# Patient Record
Sex: FEMALE | Race: OTHER | HISPANIC OR LATINO | URBAN - METROPOLITAN AREA
[De-identification: names, ages, dates, MRNs, and addresses within clinical notes are randomized per-mention and may not be internally consistent; named-entity substitution may affect disease eponyms.]

---
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
## 2021-01-13 ENCOUNTER — TRANSCRIPTION ENCOUNTER (OUTPATIENT)
Age: 21
End: 2021-01-13

## 2021-02-20 ENCOUNTER — TRANSCRIPTION ENCOUNTER (OUTPATIENT)
Age: 21
End: 2021-02-20

## 2021-03-07 ENCOUNTER — TRANSCRIPTION ENCOUNTER (OUTPATIENT)
Age: 21
End: 2021-03-07

## 2021-03-21 ENCOUNTER — TRANSCRIPTION ENCOUNTER (OUTPATIENT)
Age: 21
End: 2021-03-21

## 2021-04-19 ENCOUNTER — APPOINTMENT (OUTPATIENT)
Dept: SURGERY | Facility: CLINIC | Age: 21
End: 2021-04-19
Payer: MEDICAID

## 2021-04-19 VITALS
TEMPERATURE: 97.8 F | HEART RATE: 90 BPM | WEIGHT: 125 LBS | HEIGHT: 63 IN | OXYGEN SATURATION: 98 % | SYSTOLIC BLOOD PRESSURE: 119 MMHG | DIASTOLIC BLOOD PRESSURE: 72 MMHG | BODY MASS INDEX: 22.15 KG/M2

## 2021-04-19 DIAGNOSIS — K80.20 CALCULUS OF GALLBLADDER W/OUT CHOLECYSTITIS W/OUT OBSTRUCTION: ICD-10-CM

## 2021-04-19 DIAGNOSIS — Z83.79 FAMILY HISTORY OF OTHER DISEASES OF THE DIGESTIVE SYSTEM: ICD-10-CM

## 2021-04-19 DIAGNOSIS — Z01.818 ENCOUNTER FOR OTHER PREPROCEDURAL EXAMINATION: ICD-10-CM

## 2021-04-19 DIAGNOSIS — Z78.9 OTHER SPECIFIED HEALTH STATUS: ICD-10-CM

## 2021-04-19 PROCEDURE — 99204 OFFICE O/P NEW MOD 45 MIN: CPT

## 2021-04-19 PROCEDURE — 99072 ADDL SUPL MATRL&STAF TM PHE: CPT

## 2021-04-19 NOTE — DATA REVIEWED
[FreeTextEntry1] : \par PROCEDURE DATE: Feb 26 2020 \par INTERPRETATION: CLINICAL INFORMATION: Elevated LFTs and bilirubin. Assess \par for choledocholithiasis. \par \par COMPARISON: Abdominal ultrasound 02/25/2020. \par \par PROCEDURE: \par MRI of the abdomen was performed with and without intravenous contrast. \par IV Contrast: Gadavist. 5.5 cc administered, 2 cc discarded. \par MRCP was performed. \par \par FINDINGS: \par \par LOWER CHEST: Within normal limits. \par \par LIVER: Normal morphology. No significant steatosis. No suspicious enhancing \par lesions. \par BILE DUCTS: Normal caliber. \par GALLBLADDER: Gallbladder is nondilated, but is filled with numerous stones. \par No wall edema or pericholecystic fluid. \par SPLEEN: Within normal limits. \par PANCREAS: Within normal limits. \par ADRENALS: Within normal limits. \par KIDNEYS/URETERS: Within normal limits. \par \par VISUALIZED PORTIONS: \par \par BOWEL: Within normal limits. \par PERITONEUM: Trace to small pelvic ascites. \par VESSELS: Within normal limits. Patent portal and hepatic veins. \par RETROPERITONEUM/LYMPH NODES: No lymphadenopathy. \par ABDOMINAL WALL: Within normal limits. \par BONES: Within normal limits. \par \par IMPRESSION: \par \par Cholelithiasis. No biliary distention or choledocholithiasis. \par \par \par AMY JUAN M.D., ATTENDING RADIOLOGIST \par This document has been electronically signed. Feb 26

## 2021-04-19 NOTE — HISTORY OF PRESENT ILLNESS
[de-identified] : Ms. TADEO LUNDBERG is a 20 year  old patient who was referred by Dr. Newton Li with the chief complaint of having right upper quadrant and epigastric pain for more than a year. Pain is  radiating to the back. She reports no nausea or vomiting and no history of jaundice, acholia or choluria.   Appetite is good and weight is stable.   She   has  family history of biliary tract disease in her father .  She had an abdominal  MRI  on  02/26/2020 which revealed  numerous GB stones CBD is normal \par \par

## 2021-04-19 NOTE — CONSULT LETTER
[Dear  ___] : Dear  [unfilled], [Consult Letter:] : I had the pleasure of evaluating your patient, [unfilled]. [Consult Closing:] : Thank you very much for allowing me to participate in the care of this patient.  If you have any questions, please do not hesitate to contact me. [Please see my note below.] : Please see my note below. [Sincerely,] : Sincerely, [FreeTextEntry3] : Kevin Keen MD, FACS

## 2021-04-19 NOTE — PHYSICAL EXAM
[Abdominal Masses] : No abdominal masses [Abdomen Tenderness] : ~T ~M No abdominal tenderness [Alert] : alert [Oriented to Person] : oriented to person [Oriented to Place] : oriented to place [Oriented to Time] : oriented to time [Calm] : calm [de-identified] : anicteric.  Nasal mucosa pink, septum midline. Oral mucosa pink.  Tongue midline, Pharynx without exudates.\par   [de-identified] : She  is alert, well-groomed, and in NAD\par   [de-identified] : Neck supple. Trachea midline. Thyroid isthmus barely palpable, lobes not felt.\par   [de-identified] : no hernia

## 2021-04-19 NOTE — PLAN
[FreeTextEntry1] : Ms. LUNDBERG  was told significance of findings, options, risks and benefits were explained.  Informed consent for laparoscopic/possible open  cholecystectomy  and potential risks, benefits and alternatives (surgical options were discussed including non-surgical options or the option of no surgery) to the planned surgery were discussed in depth.  All surgical options were discussed including non-surgical treatments.  She wishes to proceed with surgery.  We will plan for surgery on at the next available date, pending any required insurance pre-certification or pre-approval. She agrees to obtain any necessary pre-operative evaluations and testing prior to surgery. Patient instructed to maintain a fat-free diet, and to seek immediate medical attention with any acute change or worsening of symptoms, including but not limited to abdominal pain, fever, chills, nausea, vomiting, or yellowing of the skin. \par Patient advised to seek immediate medical attention with any acute change in symptoms or with the development of any new or worsening symptoms.  Patient's questions and concerns addressed to patient's satisfaction, and patient verbalized an understanding of the information discussed.

## 2021-04-20 ENCOUNTER — APPOINTMENT (OUTPATIENT)
Dept: DISASTER EMERGENCY | Facility: CLINIC | Age: 21
End: 2021-04-20

## 2021-04-21 LAB — SARS-COV-2 N GENE NPH QL NAA+PROBE: NOT DETECTED

## 2021-04-22 ENCOUNTER — OUTPATIENT (OUTPATIENT)
Dept: OUTPATIENT SERVICES | Facility: HOSPITAL | Age: 21
LOS: 1 days | End: 2021-04-22
Payer: MEDICAID

## 2021-04-22 ENCOUNTER — TRANSCRIPTION ENCOUNTER (OUTPATIENT)
Age: 21
End: 2021-04-22

## 2021-04-22 VITALS
RESPIRATION RATE: 16 BRPM | SYSTOLIC BLOOD PRESSURE: 108 MMHG | DIASTOLIC BLOOD PRESSURE: 70 MMHG | HEART RATE: 68 BPM | OXYGEN SATURATION: 99 % | WEIGHT: 125 LBS | HEIGHT: 63 IN | TEMPERATURE: 97 F

## 2021-04-22 DIAGNOSIS — Z01.818 ENCOUNTER FOR OTHER PREPROCEDURAL EXAMINATION: ICD-10-CM

## 2021-04-22 DIAGNOSIS — K81.9 CHOLECYSTITIS, UNSPECIFIED: ICD-10-CM

## 2021-04-22 DIAGNOSIS — Z29.9 ENCOUNTER FOR PROPHYLACTIC MEASURES, UNSPECIFIED: ICD-10-CM

## 2021-04-22 LAB
ALBUMIN SERPL ELPH-MCNC: 4.1 G/DL — SIGNIFICANT CHANGE UP (ref 3.5–5)
ALP SERPL-CCNC: 73 U/L — SIGNIFICANT CHANGE UP (ref 40–120)
ALT FLD-CCNC: 31 U/L DA — SIGNIFICANT CHANGE UP (ref 10–60)
ANION GAP SERPL CALC-SCNC: 8 MMOL/L — SIGNIFICANT CHANGE UP (ref 5–17)
APTT BLD: 34.7 SEC — SIGNIFICANT CHANGE UP (ref 27.5–35.5)
AST SERPL-CCNC: 17 U/L — SIGNIFICANT CHANGE UP (ref 10–40)
BILIRUB SERPL-MCNC: 1.6 MG/DL — HIGH (ref 0.2–1.2)
BLD GP AB SCN SERPL QL: SIGNIFICANT CHANGE UP
BUN SERPL-MCNC: 10 MG/DL — SIGNIFICANT CHANGE UP (ref 7–18)
CALCIUM SERPL-MCNC: 9.2 MG/DL — SIGNIFICANT CHANGE UP (ref 8.4–10.5)
CHLORIDE SERPL-SCNC: 105 MMOL/L — SIGNIFICANT CHANGE UP (ref 96–108)
CO2 SERPL-SCNC: 26 MMOL/L — SIGNIFICANT CHANGE UP (ref 22–31)
CREAT SERPL-MCNC: 0.55 MG/DL — SIGNIFICANT CHANGE UP (ref 0.5–1.3)
GLUCOSE SERPL-MCNC: 86 MG/DL — SIGNIFICANT CHANGE UP (ref 70–99)
HCG SERPL-ACNC: <1 MIU/ML — SIGNIFICANT CHANGE UP
HCT VFR BLD CALC: 39.7 % — SIGNIFICANT CHANGE UP (ref 34.5–45)
HGB BLD-MCNC: 13 G/DL — SIGNIFICANT CHANGE UP (ref 11.5–15.5)
INR BLD: 1.06 RATIO — SIGNIFICANT CHANGE UP (ref 0.88–1.16)
MCHC RBC-ENTMCNC: 29.5 PG — SIGNIFICANT CHANGE UP (ref 27–34)
MCHC RBC-ENTMCNC: 32.7 GM/DL — SIGNIFICANT CHANGE UP (ref 32–36)
MCV RBC AUTO: 90.2 FL — SIGNIFICANT CHANGE UP (ref 80–100)
NRBC # BLD: 0 /100 WBCS — SIGNIFICANT CHANGE UP (ref 0–0)
PLATELET # BLD AUTO: 187 K/UL — SIGNIFICANT CHANGE UP (ref 150–400)
POTASSIUM SERPL-MCNC: 3.8 MMOL/L — SIGNIFICANT CHANGE UP (ref 3.5–5.3)
POTASSIUM SERPL-SCNC: 3.8 MMOL/L — SIGNIFICANT CHANGE UP (ref 3.5–5.3)
PROT SERPL-MCNC: 7.7 G/DL — SIGNIFICANT CHANGE UP (ref 6–8.3)
PROTHROM AB SERPL-ACNC: 12.6 SEC — SIGNIFICANT CHANGE UP (ref 10.6–13.6)
RBC # BLD: 4.4 M/UL — SIGNIFICANT CHANGE UP (ref 3.8–5.2)
RBC # FLD: 12.5 % — SIGNIFICANT CHANGE UP (ref 10.3–14.5)
SODIUM SERPL-SCNC: 139 MMOL/L — SIGNIFICANT CHANGE UP (ref 135–145)
WBC # BLD: 5.79 K/UL — SIGNIFICANT CHANGE UP (ref 3.8–10.5)
WBC # FLD AUTO: 5.79 K/UL — SIGNIFICANT CHANGE UP (ref 3.8–10.5)

## 2021-04-22 PROCEDURE — G0463: CPT

## 2021-04-22 NOTE — H&P PST ADULT - HISTORY OF PRESENT ILLNESS
20 yr old female in generally good heathy presented with gallstones in March 2020 and had sonogram that revealed and confirmed the diagnoses. Pt experienced pain all over stomach with nausea and vomiting on and off. Pt followed recommendations with low fat diet. In Jan pt had nausea and some vomiting and sought surgical consult. Pt schedule for Laparoscopic cholecystectomy with intra-op cholangiogram possible open on 4/23/2021. Pt instructed on the use of chlorhexidine wash and verbalized understanding.

## 2021-04-22 NOTE — H&P PST ADULT - NSICDXPROBLEM_GEN_ALL_CORE_FT
PROBLEM DIAGNOSES  Problem: Need for prophylactic measure  Assessment and Plan:     Problem: Cholecystitis, unspecified  Assessment and Plan:

## 2021-04-22 NOTE — H&P PST ADULT - ASSESSMENT
20 yr old healthy female schedule for Laparoscopic cholecystetomy with intra operative cholangiogram possible open on 4/23/2021.

## 2021-04-23 ENCOUNTER — OUTPATIENT (OUTPATIENT)
Dept: OUTPATIENT SERVICES | Facility: HOSPITAL | Age: 21
LOS: 1 days | End: 2021-04-23
Payer: MEDICAID

## 2021-04-23 ENCOUNTER — APPOINTMENT (OUTPATIENT)
Dept: SURGERY | Facility: HOSPITAL | Age: 21
End: 2021-04-23
Payer: MEDICAID

## 2021-04-23 ENCOUNTER — RESULT REVIEW (OUTPATIENT)
Age: 21
End: 2021-04-23

## 2021-04-23 VITALS
DIASTOLIC BLOOD PRESSURE: 71 MMHG | WEIGHT: 125 LBS | RESPIRATION RATE: 16 BRPM | SYSTOLIC BLOOD PRESSURE: 103 MMHG | HEIGHT: 63 IN | HEART RATE: 80 BPM | OXYGEN SATURATION: 100 % | TEMPERATURE: 98 F

## 2021-04-23 VITALS
OXYGEN SATURATION: 100 % | DIASTOLIC BLOOD PRESSURE: 57 MMHG | RESPIRATION RATE: 17 BRPM | SYSTOLIC BLOOD PRESSURE: 104 MMHG | HEART RATE: 71 BPM | TEMPERATURE: 98 F

## 2021-04-23 DIAGNOSIS — K81.9 CHOLECYSTITIS, UNSPECIFIED: ICD-10-CM

## 2021-04-23 LAB — BLD GP AB SCN SERPL QL: SIGNIFICANT CHANGE UP

## 2021-04-23 PROCEDURE — 47563 LAPARO CHOLECYSTECTOMY/GRAPH: CPT

## 2021-04-23 PROCEDURE — 86850 RBC ANTIBODY SCREEN: CPT

## 2021-04-23 PROCEDURE — 36415 COLL VENOUS BLD VENIPUNCTURE: CPT

## 2021-04-23 PROCEDURE — 47563 LAPARO CHOLECYSTECTOMY/GRAPH: CPT | Mod: AS

## 2021-04-23 PROCEDURE — 86900 BLOOD TYPING SEROLOGIC ABO: CPT

## 2021-04-23 PROCEDURE — 88304 TISSUE EXAM BY PATHOLOGIST: CPT | Mod: 26

## 2021-04-23 PROCEDURE — 88304 TISSUE EXAM BY PATHOLOGIST: CPT

## 2021-04-23 PROCEDURE — 86901 BLOOD TYPING SEROLOGIC RH(D): CPT

## 2021-04-23 PROCEDURE — 76000 FLUOROSCOPY <1 HR PHYS/QHP: CPT

## 2021-04-23 RX ORDER — SODIUM CHLORIDE 9 MG/ML
3 INJECTION INTRAMUSCULAR; INTRAVENOUS; SUBCUTANEOUS EVERY 8 HOURS
Refills: 0 | Status: DISCONTINUED | OUTPATIENT
Start: 2021-04-23 | End: 2021-04-23

## 2021-04-23 RX ORDER — ONDANSETRON 8 MG/1
4 TABLET, FILM COATED ORAL ONCE
Refills: 0 | Status: DISCONTINUED | OUTPATIENT
Start: 2021-04-23 | End: 2021-04-23

## 2021-04-23 RX ORDER — HYDROMORPHONE HYDROCHLORIDE 2 MG/ML
0.5 INJECTION INTRAMUSCULAR; INTRAVENOUS; SUBCUTANEOUS
Refills: 0 | Status: DISCONTINUED | OUTPATIENT
Start: 2021-04-23 | End: 2021-04-23

## 2021-04-23 RX ORDER — GABAPENTIN 400 MG/1
1 CAPSULE ORAL
Qty: 8 | Refills: 0
Start: 2021-04-23 | End: 2021-04-26

## 2021-04-23 RX ORDER — FENTANYL CITRATE 50 UG/ML
25 INJECTION INTRAVENOUS
Refills: 0 | Status: DISCONTINUED | OUTPATIENT
Start: 2021-04-23 | End: 2021-04-23

## 2021-04-23 NOTE — ASU DISCHARGE PLAN (ADULT/PEDIATRIC) - ASU DC SPECIAL INSTRUCTIONSFT
Please follow-up with your surgeon in 1 week. Drink plenty of fluids and rest as needed. Call for any fever over 101, nausea, vomiting, severe pain, no passing of gas or bowel movement.     DIET   You may resume your regular diet as normal.     SURGICAL SITES   Remove outer dressing and keep white steri-strips in place allowing them to fall off on their own. You may shower 48 hours post-operatively but do not bathe or soak in the water for 1-2 weeks; pat dry. If you notice any signs of surgical site infection (ie. redness, swelling, pain, pus drainage), please seek medical care immediately.   ACTIVITY Do not lift anything heavier than 10 pounds for 2 weeks and avoid strenuous activity for 4-6 weeks.     PAIN CONTROL   You may take Motrin 600mg (with food) or Tylenol 650mg as needed for mild pain. Stagger one medication 3 hours after the other for maximum pain control. Maximum daily dose of Tylenol should not exceed 4grams/day.  You may take your prescribed gabapentin for severe breakthrough pain not that is not relieved by Tylenol/Motrin. Do not drive or make important decisions while taking this medication and do not take more than 4 pills in 24 hours.

## 2021-04-23 NOTE — ASU PREOP CHECKLIST - SELECT TESTS ORDERED
Detail Level: Detailed
Detail Level: Generalized
BMP/CBC/PT/PTT/INR/Type and Cross/Type and Screen/HCG/COVID-19

## 2021-04-23 NOTE — ASU DISCHARGE PLAN (ADULT/PEDIATRIC) - CARE PROVIDER_API CALL
Kevin Keen)  Surgery  [address] [facility], [place] [address]  Phone: [phone]  Fax: [phone]  Established Patient  Follow Up Time: 2 weeks

## 2021-04-26 PROBLEM — K81.9 CHOLECYSTITIS, UNSPECIFIED: Chronic | Status: ACTIVE | Noted: 2021-04-22

## 2021-04-27 ENCOUNTER — TRANSCRIPTION ENCOUNTER (OUTPATIENT)
Age: 21
End: 2021-04-27

## 2021-04-28 LAB — SURGICAL PATHOLOGY STUDY: SIGNIFICANT CHANGE UP

## 2021-05-04 PROBLEM — K81.9 CHOLECYSTITIS: Status: ACTIVE | Noted: 2021-04-19

## 2021-05-10 ENCOUNTER — APPOINTMENT (OUTPATIENT)
Dept: SURGERY | Facility: CLINIC | Age: 21
End: 2021-05-10
Payer: MEDICAID

## 2021-05-10 VITALS — TEMPERATURE: 96.4 F

## 2021-05-10 DIAGNOSIS — K81.9 CHOLECYSTITIS, UNSPECIFIED: ICD-10-CM

## 2021-05-10 PROCEDURE — 99024 POSTOP FOLLOW-UP VISIT: CPT

## 2021-05-10 NOTE — PHYSICAL EXAM
[Calm] : calm [de-identified] : She  is alert, well-groomed, and in NAD\par   [de-identified] : Surgical wounds are  healing well.   no signs of  inflammation or infection.

## 2021-05-10 NOTE — HISTORY OF PRESENT ILLNESS
[de-identified] : Ms. LUNDBERG  is s/p laparoscopic  cholecystectomy on 04/23/2021. Today Ms. LUNDBERG offers no complaints. patient reports no fever, chills,  or  pain. Her  surgical wounds are  healing well. No signs of inflammation, infection or exudate. Patient reports good bowel movements and appetite.

## 2021-05-10 NOTE — DATA REVIEWED
[FreeTextEntry1] : [facility] Accession Number : [id number]\par \par LUNDBERG, TADEOSHANNON SOLOMON               2\par \par \par \par Surgical Final Report\par \par \par \par \par Final Diagnosis\par Gallbladder, cholecystectomy: Chronic calculous cholecystitis\par with mild\par mucosal attenuation\par \par Verified by: Esperanza Perea M.D.\par (Electronic Signature)\par Reported on: 04/28/21 13:55 EDT, [facility],\par [address] [place] [address]\par Phone: [phone]   Fax: [phone]\par _________________________________________________________________\par \par Clinical History\par Cholecystitis\par \par Specimen(s) Submitted\par Gallbladder

## 2021-05-10 NOTE — PLAN
[FreeTextEntry1] : Ms. LUNDBERG will follow up  if needed. Warning signs, follow up, and restrictions were discussed with the patient.

## 2021-05-10 NOTE — ASSESSMENT
[FreeTextEntry1] : Ms. LUNDBERG is doing well, with excellent post-operative recovery. All surgical incisions are healing well and as expected. There is no evidence of infection or complication, and she is progressing as expected. Post-operative wound care, activity, restrictions and precautions reinforced. Patient instructed to refrain from any heavy lifting greater than 10-15 pounds for at least 4 weeks post-operatively. pathology results were discussed in details.  Patient's questions and concerns addressed to patient's satisfaction.

## 2021-05-30 ENCOUNTER — TRANSCRIPTION ENCOUNTER (OUTPATIENT)
Age: 21
End: 2021-05-30

## 2021-08-20 ENCOUNTER — TRANSCRIPTION ENCOUNTER (OUTPATIENT)
Age: 21
End: 2021-08-20

## 2021-08-24 ENCOUNTER — TRANSCRIPTION ENCOUNTER (OUTPATIENT)
Age: 21
End: 2021-08-24

## 2021-09-28 ENCOUNTER — TRANSCRIPTION ENCOUNTER (OUTPATIENT)
Age: 21
End: 2021-09-28

## 2021-10-12 ENCOUNTER — TRANSCRIPTION ENCOUNTER (OUTPATIENT)
Age: 21
End: 2021-10-12

## 2021-11-18 ENCOUNTER — TRANSCRIPTION ENCOUNTER (OUTPATIENT)
Age: 21
End: 2021-11-18

## 2021-11-19 ENCOUNTER — TRANSCRIPTION ENCOUNTER (OUTPATIENT)
Age: 21
End: 2021-11-19

## 2022-03-21 ENCOUNTER — TRANSCRIPTION ENCOUNTER (OUTPATIENT)
Age: 22
End: 2022-03-21

## 2022-05-14 ENCOUNTER — NON-APPOINTMENT (OUTPATIENT)
Age: 22
End: 2022-05-14

## 2022-06-04 ENCOUNTER — NON-APPOINTMENT (OUTPATIENT)
Age: 22
End: 2022-06-04

## 2022-08-29 ENCOUNTER — NON-APPOINTMENT (OUTPATIENT)
Age: 22
End: 2022-08-29

## 2023-02-23 ENCOUNTER — NON-APPOINTMENT (OUTPATIENT)
Age: 23
End: 2023-02-23

## 2024-01-25 ENCOUNTER — NON-APPOINTMENT (OUTPATIENT)
Age: 24
End: 2024-01-25

## 2024-05-01 ENCOUNTER — NON-APPOINTMENT (OUTPATIENT)
Age: 24
End: 2024-05-01

## 2024-05-10 ENCOUNTER — INPATIENT (INPATIENT)
Facility: HOSPITAL | Age: 24
LOS: 1 days | Discharge: HOME | DRG: 948 | End: 2024-05-12
Attending: STUDENT IN AN ORGANIZED HEALTH CARE EDUCATION/TRAINING PROGRAM | Admitting: STUDENT IN AN ORGANIZED HEALTH CARE EDUCATION/TRAINING PROGRAM
Payer: COMMERCIAL

## 2024-05-10 VITALS
RESPIRATION RATE: 18 BRPM | WEIGHT: 115.96 LBS | OXYGEN SATURATION: 100 % | HEART RATE: 77 BPM | DIASTOLIC BLOOD PRESSURE: 72 MMHG | TEMPERATURE: 98 F | SYSTOLIC BLOOD PRESSURE: 112 MMHG

## 2024-05-10 DIAGNOSIS — R74.01 ELEVATION OF LEVELS OF LIVER TRANSAMINASE LEVELS: ICD-10-CM

## 2024-05-10 DIAGNOSIS — Z90.49 ACQUIRED ABSENCE OF OTHER SPECIFIED PARTS OF DIGESTIVE TRACT: Chronic | ICD-10-CM

## 2024-05-10 DIAGNOSIS — Z29.9 ENCOUNTER FOR PROPHYLACTIC MEASURES, UNSPECIFIED: ICD-10-CM

## 2024-05-10 DIAGNOSIS — R10.9 UNSPECIFIED ABDOMINAL PAIN: ICD-10-CM

## 2024-05-10 LAB
ALBUMIN SERPL ELPH-MCNC: 4.2 G/DL — SIGNIFICANT CHANGE UP (ref 3.5–5)
ALP SERPL-CCNC: 237 U/L — HIGH (ref 40–120)
ALT FLD-CCNC: 489 U/L DA — HIGH (ref 10–60)
ANION GAP SERPL CALC-SCNC: 5 MMOL/L — SIGNIFICANT CHANGE UP (ref 5–17)
APAP SERPL-MCNC: <2 UG/ML — LOW (ref 10–30)
AST SERPL-CCNC: 771 U/L — HIGH (ref 10–40)
BASOPHILS # BLD AUTO: 0.04 K/UL — SIGNIFICANT CHANGE UP (ref 0–0.2)
BASOPHILS NFR BLD AUTO: 0.2 % — SIGNIFICANT CHANGE UP (ref 0–2)
BILIRUB SERPL-MCNC: 1.8 MG/DL — HIGH (ref 0.2–1.2)
BUN SERPL-MCNC: 10 MG/DL — SIGNIFICANT CHANGE UP (ref 7–18)
CALCIUM SERPL-MCNC: 9.5 MG/DL — SIGNIFICANT CHANGE UP (ref 8.4–10.5)
CHLORIDE SERPL-SCNC: 103 MMOL/L — SIGNIFICANT CHANGE UP (ref 96–108)
CK SERPL-CCNC: 111 U/L — SIGNIFICANT CHANGE UP (ref 21–215)
CO2 SERPL-SCNC: 28 MMOL/L — SIGNIFICANT CHANGE UP (ref 22–31)
CREAT SERPL-MCNC: 0.6 MG/DL — SIGNIFICANT CHANGE UP (ref 0.5–1.3)
EGFR: 130 ML/MIN/1.73M2 — SIGNIFICANT CHANGE UP
EOSINOPHIL # BLD AUTO: 0.01 K/UL — SIGNIFICANT CHANGE UP (ref 0–0.5)
EOSINOPHIL NFR BLD AUTO: 0.1 % — SIGNIFICANT CHANGE UP (ref 0–6)
GLUCOSE SERPL-MCNC: 113 MG/DL — HIGH (ref 70–99)
HCG SERPL-ACNC: <1 MIU/ML — SIGNIFICANT CHANGE UP
HCT VFR BLD CALC: 41.9 % — SIGNIFICANT CHANGE UP (ref 34.5–45)
HGB BLD-MCNC: 13.6 G/DL — SIGNIFICANT CHANGE UP (ref 11.5–15.5)
IMM GRANULOCYTES NFR BLD AUTO: 0.3 % — SIGNIFICANT CHANGE UP (ref 0–0.9)
LIDOCAIN IGE QN: 22 U/L — SIGNIFICANT CHANGE UP (ref 13–75)
LYMPHOCYTES # BLD AUTO: 1.01 K/UL — SIGNIFICANT CHANGE UP (ref 1–3.3)
LYMPHOCYTES # BLD AUTO: 5.7 % — LOW (ref 13–44)
MCHC RBC-ENTMCNC: 30.1 PG — SIGNIFICANT CHANGE UP (ref 27–34)
MCHC RBC-ENTMCNC: 32.5 GM/DL — SIGNIFICANT CHANGE UP (ref 32–36)
MCV RBC AUTO: 92.7 FL — SIGNIFICANT CHANGE UP (ref 80–100)
MONOCYTES # BLD AUTO: 0.94 K/UL — HIGH (ref 0–0.9)
MONOCYTES NFR BLD AUTO: 5.3 % — SIGNIFICANT CHANGE UP (ref 2–14)
NEUTROPHILS # BLD AUTO: 15.69 K/UL — HIGH (ref 1.8–7.4)
NEUTROPHILS NFR BLD AUTO: 88.4 % — HIGH (ref 43–77)
NRBC # BLD: 0 /100 WBCS — SIGNIFICANT CHANGE UP (ref 0–0)
PLATELET # BLD AUTO: 216 K/UL — SIGNIFICANT CHANGE UP (ref 150–400)
POTASSIUM SERPL-MCNC: 3.9 MMOL/L — SIGNIFICANT CHANGE UP (ref 3.5–5.3)
POTASSIUM SERPL-SCNC: 3.9 MMOL/L — SIGNIFICANT CHANGE UP (ref 3.5–5.3)
PROT SERPL-MCNC: 7.3 G/DL — SIGNIFICANT CHANGE UP (ref 6–8.3)
RBC # BLD: 4.52 M/UL — SIGNIFICANT CHANGE UP (ref 3.8–5.2)
RBC # FLD: 12.9 % — SIGNIFICANT CHANGE UP (ref 10.3–14.5)
SODIUM SERPL-SCNC: 136 MMOL/L — SIGNIFICANT CHANGE UP (ref 135–145)
TROPONIN I, HIGH SENSITIVITY RESULT: <3 NG/L — SIGNIFICANT CHANGE UP
WBC # BLD: 17.75 K/UL — HIGH (ref 3.8–10.5)
WBC # FLD AUTO: 17.75 K/UL — HIGH (ref 3.8–10.5)

## 2024-05-10 PROCEDURE — 99285 EMERGENCY DEPT VISIT HI MDM: CPT

## 2024-05-10 PROCEDURE — 99222 1ST HOSP IP/OBS MODERATE 55: CPT

## 2024-05-10 PROCEDURE — 99223 1ST HOSP IP/OBS HIGH 75: CPT | Mod: GC

## 2024-05-10 PROCEDURE — 71046 X-RAY EXAM CHEST 2 VIEWS: CPT | Mod: 26

## 2024-05-10 PROCEDURE — 76705 ECHO EXAM OF ABDOMEN: CPT | Mod: 26

## 2024-05-10 PROCEDURE — 74177 CT ABD & PELVIS W/CONTRAST: CPT | Mod: 26,MC

## 2024-05-10 RX ORDER — FAMOTIDINE 10 MG/ML
20 INJECTION INTRAVENOUS ONCE
Refills: 0 | Status: COMPLETED | OUTPATIENT
Start: 2024-05-10 | End: 2024-05-10

## 2024-05-10 RX ORDER — ONDANSETRON 8 MG/1
4 TABLET, FILM COATED ORAL ONCE
Refills: 0 | Status: COMPLETED | OUTPATIENT
Start: 2024-05-10 | End: 2024-05-10

## 2024-05-10 RX ORDER — ONDANSETRON 8 MG/1
4 TABLET, FILM COATED ORAL EVERY 8 HOURS
Refills: 0 | Status: DISCONTINUED | OUTPATIENT
Start: 2024-05-10 | End: 2024-05-12

## 2024-05-10 RX ORDER — SODIUM CHLORIDE 9 MG/ML
1000 INJECTION INTRAMUSCULAR; INTRAVENOUS; SUBCUTANEOUS ONCE
Refills: 0 | Status: COMPLETED | OUTPATIENT
Start: 2024-05-10 | End: 2024-05-10

## 2024-05-10 RX ORDER — KETOROLAC TROMETHAMINE 30 MG/ML
30 SYRINGE (ML) INJECTION ONCE
Refills: 0 | Status: DISCONTINUED | OUTPATIENT
Start: 2024-05-10 | End: 2024-05-10

## 2024-05-10 RX ADMIN — FAMOTIDINE 20 MILLIGRAM(S): 10 INJECTION INTRAVENOUS at 14:06

## 2024-05-10 RX ADMIN — Medication 30 MILLIGRAM(S): at 14:06

## 2024-05-10 RX ADMIN — Medication 30 MILLIGRAM(S): at 14:36

## 2024-05-10 RX ADMIN — SODIUM CHLORIDE 1000 MILLILITER(S): 9 INJECTION INTRAMUSCULAR; INTRAVENOUS; SUBCUTANEOUS at 12:07

## 2024-05-10 RX ADMIN — SODIUM CHLORIDE 1000 MILLILITER(S): 9 INJECTION INTRAMUSCULAR; INTRAVENOUS; SUBCUTANEOUS at 13:07

## 2024-05-10 NOTE — H&P ADULT - HISTORY OF PRESENT ILLNESS
Patient is a 24 y/o F with PMH of cholecystectomy who p/w severe onset RUQ pain, nausea and vomiting. Patient reported she developed sudden sharp pain in her RUQ this morning which was intermittent in nature. Patient also had associated associated severe nausea, and vomiting. Patient denies any associated fevers, chills, or diarrhea. Patient reported the symptoms lasted 4-5 hours. Patient reports her pain and n/v have now resolved and she is symptom free. Patient denies any associated weakness, fatigue, malaise, itchiness, change in color of urine or stool. Patient reported that she had recently started taking creatine/BCAA prework out supplement a few days ago. Patient denies eating at a new restaurant recently. Patient recently had ear piercing and uses alcohol socially. Patient denies IV drug use. Patient denies any recent fevers, chills, weakness, fatigue, malaise, headache, dizziness, lightheadedness, chest pain, palpitations, shortness of breath, cough, constipation, melena, hematochezia, dysuria, urinary frequency, or urgency. Patient denies any other complains at this time.

## 2024-05-10 NOTE — ED PROVIDER NOTE - CLINICAL SUMMARY MEDICAL DECISION MAKING FREE TEXT BOX
23-year-old female with a history of cholecystectomy presents with upper abdominal pain, nausea, vomiting and chest pain that began today.  Patient has not taken any medications for symptoms.  Patient reports she recently started taking preworkout.  Patient denies fevers, urinary complaints, diarrhea.    abdomen is soft, non-tender, nondistended.  PERC negative.  Symptoms likely due to gastritis secondary to new intake of preworkout.  However will rule out ACS.  Will give medications for symptoms and reassess.

## 2024-05-10 NOTE — ED PROVIDER NOTE - NS ED MD DISPO DIVISION
Patient's grandmother states that she fears the patient is suicidal. Dr. Moon Nik aware and spoke with patient at length he denies suicidal ideations at this time.       Konrad Franco RN  01/10/18 0[facility]

## 2024-05-10 NOTE — CONSULT NOTE ADULT - SUBJECTIVE AND OBJECTIVE BOX
Chief Complaint:  Patient is a 23y old  Female who presents with a chief complaint of     HPI:  TADEO LUNDBERG is a 23y Female w/ Hx of cholecystectomy due to gallstones presented to [facility] ER on 5/10/24 w/ 1 day sudden onset of RUQ pain, a/w nausea and one episode of NBNB vomiting, w/o any other associated symptoms, and was found to have severe hepatocellular liver injury w/ mild hyperbilirubinemia and leukocytosis.   Hepatology was consulted for the elevated liver enzymes.   Patient has not prior Hx of liver disease, no FHx of liver disease. She has recent ear piercing, and she started to take workout supplements around 5/4/24, including "Best BCAA Shredded", "Best Creatine", and has also been taking "Gold standard isolate" and Natures Truth Hair, skin and nails gummies, but the later ones she has been taking for "long time".   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 She eats cold cuts frequently, but no recent Hx of eating out, no recent travel, no recent mushroom consumption, no sick contacts. Denies toxic habits, drinks alcohol socially, max 3-4x a months, 1-2 drinks (Margaritas) per occasion. Single.     By the time of the exam her symptoms resolved.       PMHX/PSHX:    Cholecystitis, unspecified  History of cholecystectomy      Allergies:  No Known Allergies      Home Medications: reviewed  Hospital Medications:  ondansetron Injectable 4 milliGRAM(s) IV Push every 8 hours PRN      Social History:   Tob: Denies  EtOH: social  Illicit Drugs: Denies    Family history:  No pertinent family history in first degree relatives    ROS:   General:  No  fevers, chills, night sweats, fatigue  Eyes:  Good vision, no reported pain  ENT:  No sore throat, pain, runny nose  CV:  No pain, palpitations  Pulm:  No dyspnea, cough  GI:  See HPI, otherwise negative  :  No  dysuria  Muscle:  No pain, weakness  Neuro:  No memory problems  Psych:  No insomnia, mood problems, depression  Endocrine:  No polyuria, polydipsia, cold/heat intolerance  Heme:  No petechiae, ecchymosis, easy bruisability  Skin:  No rash    PHYSICAL EXAM:   Vital Signs:  Vital Signs Last 24 Hrs  T(C): 36.7 (10 May 2024 21:36), Max: 36.8 (10 May 2024 17:41)  T(F): 98 (10 May 2024 21:36), Max: 98.2 (10 May 2024 17:41)  HR: 67 (10 May 2024 21:36) (66 - 77)  BP: 116/77 (10 May 2024 21:36) (112/72 - 119/78)  BP(mean): --  RR: 18 (10 May 2024 21:36) (18 - 18)  SpO2: 98% (10 May 2024 21:36) (98% - 100%)    Parameters below as of 10 May 2024 21:36  Patient On (Oxygen Delivery Method): room air      Daily     Daily     GENERAL: no acute distress  NEURO: AAOx3, no asterixis  HEENT: anicteric sclera, no conjunctival pallor appreciated  CHEST: no respiratory distress, no accessory muscle use  CARDIAC: regular rate, rhythm  ABDOMEN: soft, non-tender, non-distended, no rebound or guarding, BS+  EXTREMITIES: warm, well perfused, no edema  SKIN: no rash    LABS: reviewed                        13.6   17.75 )-----------( 216      ( 10 May 2024 13:00 )             41.9     05-10    136  |  103  |  10  ----------------------------<  113<H>  3.9   |  28  |  0.60    Ca    9.5      10 May 2024 13:00    TPro  7.3  /  Alb  4.2  /  TBili  1.8<H>  /  DBili  x   /  AST  771<H>  /  ALT  489<H>  /  AlkPhos  237<H>  05-10    LIVER FUNCTIONS - ( 10 May 2024 13:00 )  Alb: 4.2 g/dL / Pro: 7.3 g/dL / ALK PHOS: 237 U/L / ALT: 489 U/L DA / AST: 771 U/L / GGT: x               Diagnostic Studies: see sunrise for full report

## 2024-05-10 NOTE — PATIENT PROFILE ADULT - FALL HARM RISK - UNIVERSAL INTERVENTIONS
Bed in lowest position, wheels locked, appropriate side rails in place/Call bell, personal items and telephone in reach/Instruct patient to call for assistance before getting out of bed or chair/Non-slip footwear when patient is out of bed/[place] to call system/Physically safe environment - no spills, clutter or unnecessary equipment/Purposeful Proactive Rounding/Room/bathroom lighting operational, light cord in reach

## 2024-05-10 NOTE — H&P ADULT - ASSESSMENT
Patient is a 22 y/o F with PMH of cholecystectomy who p/w severe onset RUQ, nausea, and vomiting. Patient's LFTs were noted to be significantly elevated. Patient is being admitted for further management of abdominal pain and transaminitis.

## 2024-05-10 NOTE — H&P ADULT - ATTENDING COMMENTS
22 y/o F with PMH of cholecystectomy who p/w severe onset RUQ, nausea, and vomiting. Patient's LFTs were noted to be significantly elevated. Patient is being admitted for further management of abdominal pain and transaminitis.     #Transaminitis - unclear etiology - possible creatine supplement  #Abdominal pain  #s/p cholecystectomy  Patient recently started taking creatine supplements. 1 day of sudden abdominal pain and nausea vomitting. Found to have elevated liver enzymes, no encephalopathy or synthetic dysfunction. Can possible Imaging unremarkable.  - Asymptomatic now  - Monitor LFT  - Monitor INR - stable  - f/u Hepatology recs - f/u labs, sent  - advised to hold all OTC  - DVT ppx

## 2024-05-10 NOTE — ED ADULT NURSE NOTE - OBJECTIVE STATEMENT
22 yo female sitting on a chair c/o abdominal pain associated with nausea and vomiting. 22 yo female sitting on a chair c/o abdominal pain associated with nausea and vomiting that started this morning. Patient endorses chest pain.

## 2024-05-10 NOTE — H&P ADULT - PROBLEM SELECTOR PLAN 2
- P/w elevated liver enzymes.   - ALP - 237, AST - 771, ALT - 489.   - Social alcohol use with last drink last Friday.   - CT a/p as above. However, passage of a CBD stone remains a possibility.   - RUQ US: Status post cholecystectomy. Unremarkable right upper quadrant abdominal ultrasound.  - F/U MARIBEL, anti-smooth muscle Ab, antimitochondrial Ab, IgG4 to check for autoimmune hepatitis   - F/U Hepatitis panel (including core and surface Ab) to check for infection   - F/U ceruloplasmin level to check for hazel's disease   - F/U Alpha Anti-tripsin  - Hepatology consutled: Dr. Munoz - P/w elevated liver enzymes.   - ALP - 237, AST - 771, ALT - 489.   - Social alcohol use with last drink last Friday.   - CT a/p as above. However, passage of a CBD stone remains a possibility.   - RUQ US: Status post cholecystectomy. Unremarkable right upper quadrant abdominal ultrasound.  - F/U MARIBEL, anti-smooth muscle Ab, antimitochondrial Ab, IgG4 to check for autoimmune hepatitis   - F/U Hepatitis panel (including core and surface Ab) to check for infection   - F/U ceruloplasmin level to check for hazel's disease   - F/U Alpha Anti-tripsin.   - F/U PT/INR.   - Trend LFTs. If worsening, patient will need MRCP.   - Hepatology consutled: Dr. Munoz

## 2024-05-10 NOTE — H&P ADULT - PROBLEM SELECTOR PLAN 3
[facility] [facility] nurse will be going to check the INR at 130pm today
Checking to see if pt having INR today. Family will check with HH to see if they are still coming with the weather.
See anticoagulation flow sheet.
- Does not need DVT prophylaxis as low risk for DVT.

## 2024-05-10 NOTE — ED ADULT TRIAGE NOTE - INTERNATIONAL TRAVEL
No Electrodesiccation And Curettage Text: The wound bed was treated with electrodesiccation and curettage after the biopsy was performed.

## 2024-05-10 NOTE — CONSULT NOTE ADULT - ASSESSMENT
23y Female w/ Hx of laparoscopic cholecystectomy due to calculous cholecystitis (4/23/21, Dr. Keen) presented to [facility] ER on 5/10/24 w/ 1 day sudden onset of RUQ pain, a/w nausea and one episode of NBNB vomiting, w/o any other associated symptoms, and was found to have severe hepatocellular liver injury w/ mild hyperbilirubinemia and leukocytosis.   Hepatology was consulted for the elevated liver enzymes.     # RUQ abdominal pain - resolved  # Nausea, one episode NBNB vomiting - resolved  # Leukocytosis  # Severe hepatocellular liver enzyme elevation  # Mild hyperbilirubinemia  - CT a/p and US abd showed s/p cholecystectomy, and normal biliary tree; lipase WNL.   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 (Currently no evidence of HE.)  - Please, check: acetaminophen, Tylenol levels, Hep A IgM / IgG, HBsAg, HBcAb IgM/total, HBsAB, HBV PCR, HCV ab/RNA, Hep E IgM/PCR, EBV/CMV/HSV/VZV PCRs, MARIBEL, SMA, LKM, SLA, Ig panel, ceruloplasmin ferritin, iron/TIBC, A1AT for now.  - Advised to stop all OTC supplements, especially the new ones  ("Best BCAA Shredded", "Best Creatine")  - Advised on complete alcohol abstinence  - Supportive measures per primary team  - If liver enzymes rising and/or rising INR, impending failure, will need NAC  - Might need further w/u depending on course / results, if abdominal pain persists.     Thank you for consult  Will monitor. Hepatology returns Monday. Please, consult GI on call in case change in status, questions, concerns.   Discussed w/ primary team, patient        23y Female w/ Hx of laparoscopic cholecystectomy due to calculous cholecystitis (4/23/21, Dr. Keen) presented to [facility] ER on 5/10/24 w/ 1 day sudden onset of RUQ pain, a/w nausea and one episode of NBNB vomiting, w/o any other associated symptoms, and was found to have severe hepatocellular liver injury w/ mild hyperbilirubinemia and leukocytosis.   Hepatology was consulted for the elevated liver enzymes.     # RUQ abdominal pain - resolved  # Nausea, one episode NBNB vomiting - resolved  # Leukocytosis  # Severe hepatocellular liver enzyme elevation  # Mild hyperbilirubinemia  - CT a/p and US abd showed s/p cholecystectomy, and normal biliary tree; lipase WNL.   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 (Currently no evidence of HE.)  - Please, check: acetaminophen, salicylate levels, Hep A IgM / IgG, HBsAg, HBcAb IgM/total, HBsAB, HBV PCR, HCV ab/RNA, Hep E IgM/PCR, EBV/CMV/HSV/VZV PCRs, MARIBEL, SMA, LKM, SLA, Ig panel, ceruloplasmin ferritin, iron/TIBC, A1AT for now.  - Advised to stop all OTC supplements, especially the new ones  ("Best BCAA Shredded", "Best Creatine")  - Advised on complete alcohol abstinence  - Supportive measures per primary team  - If liver enzymes rising and/or rising INR, impending failure, will need NAC  - Might need further w/u depending on course / results, if abdominal pain persists.     Thank you for consult  Will monitor. Hepatology returns Monday. Please, consult GI on call in case change in status, questions, concerns.   Discussed w/ primary team, patient

## 2024-05-10 NOTE — ED ADULT NURSE NOTE - ED STAT RN HANDOFF DETAILS 2
Patient A&Ox4 admitted to medicine IV intact, no redness or swelling noted. Endorsed to Andrea GOLDBERG.

## 2024-05-10 NOTE — ED PROVIDER NOTE - CARE PLAN
1 Principal Discharge DX:	Chest pain  Secondary Diagnosis:	Abdominal pain   Principal Discharge DX:	Transaminitis   Principal Discharge DX:	Transaminitis  Secondary Diagnosis:	Abdominal pain

## 2024-05-10 NOTE — H&P ADULT - PROBLEM SELECTOR PLAN 1
- P/w sudden onset severe RUQ pain, nausea, vomiting which has now resolved.   - CT a/p: No evidence acute abdominal or pelvic pathology. Status post cholecystectomy. No biliary duct dilatation.  - Lipase - 22  - CK - 111  - Less likely to be viral gastroenteritis, but can not be ruled out.   - Avoid tylenol for pain.   - Can give NSAIDs as needed for pain.

## 2024-05-10 NOTE — ED PROVIDER NOTE - OBJECTIVE STATEMENT
23-year-old female with a history of cholecystectomy presents with upper abdominal pain, nausea, vomiting and chest pain that began today.  Patient has not taken any medications for symptoms.  Patient reports she recently started taking preworkout.  Patient denies fevers, urinary complaints, diarrhea.

## 2024-05-10 NOTE — ED ADULT NURSE NOTE - NSFALLUNIVINTERV_ED_ALL_ED
Bed/Stretcher in lowest position, wheels locked, appropriate side rails in place/Call bell, personal items and telephone in reach/Instruct patient to call for assistance before getting out of bed/chair/stretcher/Non-slip footwear applied when patient is off stretcher/[place] to call system/Physically safe environment - no spills, clutter or unnecessary equipment/Purposeful proactive rounding/Room/bathroom lighting operational, light cord in reach

## 2024-05-10 NOTE — ED PROVIDER NOTE - ATTENDING APP SHARED VISIT CONTRIBUTION OF CARE
I agree with the NP findings except as noted in my attestation note.    23 female with hx of prior gallstones s/p cholecystectomy.   Pt presenting to the ED reporting upper abdominal pain & vomiting today.    Constitutional: (-) fever (-) chills  HENT: (-) congestion (-) rhinorrhea (-) sore throat  Eyes: (-) pain (-) redness  Respiratory: (-) cough (-) shortness of breath (-) wheezing (-) stridor  Cardiovascular: (-) chest pain (-) palpitations (-) leg swelling  Gastrointestinal: (+) abdominal pain (-) blood in stool (no melena/hematochezia) (-) diarrhea (+) vomiting  Genitourinary: (-) dysuria (-) hematuria  Musculoskeletal: (-) gait problem (-) joint swelling (-) myalgias  Skin: (-) color change (-) rash  Neurological: (-) weakness (-) numbness (-) headaches  Psychiatric/Behavioral: (-) confusion    Gen:  Awake, alert, NAD, WDWN, NCAT, non-toxic appearing.   Eyes:  PERRL, EOMI, no icterus, normal lids/lashes, normal conjunctivae.  ENT:  External inspection normal, pink/moist membranes.    CV:  S1S2, regular rate and rhythm, no murmur/gallops/rubs, no JVD, 2+ pulses b/l, no edema/cords/homans, warm/well-perfused.  Resp:  Normal respiratory rate/effort, no respiratory distress, normal voice, speaking full sentences, lungs clear to auscultation b/l, no wheezing/rales/rhonchi, no retractions, no stridor.  Abd:  Soft abdomen, no tender/distended/guarding/rebound, no pulsatile mass, no CVA tender.  Negative Hinds  Musculoskeletal:  N/V intact, FROM all 4 extremities, normal motor tone, stable gait.   Neck:  FROM neck, supple, trachea midline, no meningismus.   Skin:  Color normal for race, warm and dry, no rash.  Neuro:  Oriented x3, CN 2-12 intact (grossly), normal motor (grossly), normal sensory (grossly),  normal gait.  GCS 15  Psych:  Attention normal. Affect normal. Behavior normal. Judgment normal.     MDM/Impression:  Vitals stable.  Nontoxic appearing, n/v intact.  Airway intact, no respiratory distress, no hypoxia.  No abdominal or CVA tenderness.      Plan to obtain:  -Labs, analgesia/antiemetics needed, observe/reassess    ED Course:  Lab values with leukocytosis & abnormal LFTs.  Acetaminophen negative.  Ultrasound added on.  Ultrasound with 6 mm CBD.  CT added on.  CT showing no acute pathology.  My interpretation of EKG: Sinus @72, normal axis, normal interval, normal ST/T  Patient requires inpatient admission for further care & stabilization. Care signed out to inpatient team.

## 2024-05-10 NOTE — ED PROVIDER NOTE - INPATIENT RESIDENT/ACP NOTIFIED DATE
Physical Therapy  Visit Type: treatment  Precautions:  Medical precautions:  fall risk; standard precautions.  Per EMR, \"patient is a 60 y/o male with PHM of hypertension, hyperlipidemia, hypothyroidism, JARROD, chronic L facial nerve palsy, chronic low back pain s/p multiple lumbar surgeries and \"mixed connective tissue disease.\" ... Presented to the ER on 9/8/21 with generally weakness that has been progressive for the last 2 years... His weakness appears most likely to be due to a progressive neuromuscular disease.\"  Pt received trial 5 treatments of plasmaphoresis.    Prior to admission, patient lived [place] a ranch with [place] with his spouse. Patient was modified independent for activities of daily living and performed SPT from bed<>w/c.   Lines:       Lines in chart and on patient reviewed, precautions maintained throughout session.  Hearing: no hearing deficits  Vision:     Current vision: no visual deficits  Safety Measures: chair alarm    SUBJECTIVE  Patient agreed to participate in therapy this date.  Pt reports he has been working so hard that he is having bilateral shoulder pain. Pt states that he thinks he is performing the exercises independently too often.  \"I think I need to back off a bit.\"  Patient / Family Goal: return to previous functional status and return home    Pain   RN informed on pain level     OBJECTIVE     Oriented to person, place, time and situation     Arousal alertness: appropriate responses to stimuli    Affect/Behavior: alert, appropriate, calm, cooperative and pleasant  Patient activity tolerance: 1 to 1 activity to rest  Functional Communication/Cognition    Overall status:  Within functional limits  Range of Motion (measured in degrees unless otherwise noted, active unless indicated)  Shoulder:  - Flexion (180):      • Right:  80  Comments / Details: Pt has full PROM to le's  Strength (out of 5 unless otherwise indicated)   Hip:  Hip Flexion: Left: 2 Right: 2-  Knee:   - Flexion:       • Left: 3-      • Right: 3-   - Extension:      • Left: 3-      • Right: 3-  Ankle:    - Dorsiflexion:      • Left: 2-      • Right: 2-   - Plantar Flexion:      • Left: 2      • Right: 1  Balance    Sitting: Static: supervision single upper extremity support and double lower extremity support, Dynamic: contact guard/touching/steadying assist double lower extremity support and double upper extrmity support    Bed Mobility:    Rolling left: Pt recieved in bathroom standing in olga plus.    Reposition in bed: Pt holding to bedrail overhead with left ue and pre-positioned in hooklying position to assist.      Supine to sit: stand by assist (HOB elevated)  Transfers:    Assistive devices: non-mechanical sit to stand lift, 1 person and gait belt    Sit to stand: maximal assist    Stand to sit: maximal assist    Stand pivot: not attempted due to safety concerns   Training completed:    Tasks: sit to stand and stand to sit    Education details: body mechanics and patient safety    Patient able to go from raised bed height to olga [employer] with min A X 1.  Patient able to go from sitting on olga [employer] seat to standing with min a x 1.  Patient continues to require max a x 1 for sit to stand from lower surfaces.  Pt able to increase assistance as knees become more greatly extended.   Gait/Ambulation:     Assistance: not attempted due to safety concerns          Interventions     Supine    Lower Extremity: Bilateral: bridging, hip internal rotation in hooklying and hip external rotation in hooklying (SAQ over rolled up pillow), AROM, 10 reps, 2 sets  Additional exercise details: Patient positioned in hip flexion and asked to hold and eccentrically lower the le back to the floor while supine in bed. (10X2 each le)    Patient positioned in hip flexion and asked to push against therapists resistance hold and eccentrically lower the le to the bed. (10X2 each le)    Patient positioned in hip flexion and knee extension and asked to  perform knee flexion against resistance.  (1X10 each le)    Pt positioned in hooklying, AAROM to PF/DF 10 X each    Pt stood 3 trials of 1 minute.  Pt shifting weight side to side attempting to unweight one le.  Pt performing slight knee bends.     Reviewed further exercises he can perform with wife's assistance.  Pt able to verbalize.  Training provided: HEP training, body mechanics, breathing/relaxation, positioning, balance retraining, transfer training, use of assistive device and safety training    Skilled input: Verbal instruction/cues, tactile instruction/cues and facilitation  Verbal Consent: Writer verbally educated and received verbal consent for hand placement, positioning of patient, and techniques to be performed today from patient for therapist position for techniques, hand placement and palpation for techniques and clothing adjustments for techniques as described above and how they are pertinent to the patient's plan of care.       ASSESSMENT    Impairments: range of motion, strength, activity tolerance, shortness of breath, balance deficits and endurance  Functional Limitations: all functional mobility  Pt received in bed.  Pt performed exercises as detailed above.  Pt remains motivated and continues to work hard at all his therapy sessions.  Stretches to bilateral hip in flexion and piriformis stretch performed 2 X 30 seconds.  Sit to stand with olga stedy and max A X 1.  Pt stood for 1 minute each repetition.  Pt able to go from sit to stand from olga stedy seat or raised bed height with min A X 1.  Pt continues to show slow stedy progress.  Pt able to scoot back in the chair with supervision. Pt continues to tolerate increased repetitions and more active involvement observed to bilateral feet. (left more than right)  Pt's breathing continues to be labored requiring rests between exercises.  Pt will benefit from continued skilled PT.        Discharge Recommendations   Recommendation for Discharge:  PT IL: Patient is appropriate for daily Physical Therapy        PT/OT Mobility Equipment for Discharge: Has wheelchair and RW   PT/OT ADL Equipment for Discharge: defer               Skilled therapy is required to address these limitations in attempt to maximize the patient's independence.  Progress: improving as expected and progressing toward goals    End of Session:   Location: in chair  Safety measures: call light within reach and alarm system in place/re-engaged  Handoff to: nurse  Education Provided:   Learning assessment:  - Primary learner: patient  - Are they ready to learn: yes  - Preferred learning style: demonstration and verbal  - Barriers to learning: no barriers apparent at this time  Education provided during session:  - Receiving education: patient  - Results of above outlined education: Verbalizes understanding and Demonstrates understanding    PLAN    Suggestions for next session as indicated: PT Frequency: 5 days/week        Interventions: balance, bed mobility, functional transfer training, safety education, patient/family training, HEP train/position, strengthening, ROM and gait training  Agreement to plan and goals: patient agrees with goals and treatment plan        GOALS  Review Date: 10/5/2021  Long Term Goals: (to be met by time of discharge from hospital)  Rolling right: Patient will complete bed mobility for rolling right independent.  Status: progressing/ongoing  Sit to supine: Patient will complete sit to supine independent.  Status: progressing/ongoing  Supine to sit: Patient will complete supine to sit modified independent.  Status: progressing/ongoing  Reposition in bed: Patient will complete repositioning in bed modified independent.  Status: progressing/ongoing  Sit to stand: Patient will complete sit to stand transfer with 2-wheeled walker, modified independent.   Status: progressing/ongoing  Stand pivot: Patient will complete stand pivot transfer with 2-wheeled walker, modified  independent.   Status: progressing/ongoing  Ambulation (even): Patient will ambulate on even surface for 25 feet with 2-wheeled walker, contact guard or touching/steadying.   Status: progressing/ongoing  Stairs: Patient will ambulate 2 steps with 2-wheeled walker minimal assist.  Status: discontinued    Documented in the chart in the following areas: Pain. Assessment.      Therapy procedure time and total treatment time can be found documented on the Time Entry flowsheet   10-May-2024 17:22

## 2024-05-11 LAB
A1AT SERPL-MCNC: 124 MG/DL — SIGNIFICANT CHANGE UP (ref 90–200)
ALBUMIN SERPL ELPH-MCNC: 3.4 G/DL — LOW (ref 3.5–5)
ALP SERPL-CCNC: 233 U/L — HIGH (ref 40–120)
ALT FLD-CCNC: 677 U/L DA — HIGH (ref 10–60)
ANION GAP SERPL CALC-SCNC: 4 MMOL/L — LOW (ref 5–17)
AST SERPL-CCNC: 497 U/L — HIGH (ref 10–40)
BILIRUB DIRECT SERPL-MCNC: 0.4 MG/DL — HIGH (ref 0–0.3)
BILIRUB INDIRECT FLD-MCNC: 1.4 MG/DL — HIGH (ref 0.2–1)
BILIRUB SERPL-MCNC: 1.8 MG/DL — HIGH (ref 0.2–1.2)
BUN SERPL-MCNC: 7 MG/DL — SIGNIFICANT CHANGE UP (ref 7–18)
CALCIUM SERPL-MCNC: 8.8 MG/DL — SIGNIFICANT CHANGE UP (ref 8.4–10.5)
CERULOPLASMIN SERPL-MCNC: 22 MG/DL — SIGNIFICANT CHANGE UP (ref 16–45)
CHLORIDE SERPL-SCNC: 109 MMOL/L — HIGH (ref 96–108)
CO2 SERPL-SCNC: 29 MMOL/L — SIGNIFICANT CHANGE UP (ref 22–31)
CREAT SERPL-MCNC: 0.66 MG/DL — SIGNIFICANT CHANGE UP (ref 0.5–1.3)
EGFR: 127 ML/MIN/1.73M2 — SIGNIFICANT CHANGE UP
GLUCOSE SERPL-MCNC: 98 MG/DL — SIGNIFICANT CHANGE UP (ref 70–99)
HAV IGM SER-ACNC: SIGNIFICANT CHANGE UP
HAV IGM SER-ACNC: SIGNIFICANT CHANGE UP
HBV CORE AB SER-ACNC: SIGNIFICANT CHANGE UP
HBV CORE IGM SER-ACNC: SIGNIFICANT CHANGE UP
HBV SURFACE AB SER-ACNC: SIGNIFICANT CHANGE UP
HBV SURFACE AG SER-ACNC: SIGNIFICANT CHANGE UP
HCT VFR BLD CALC: 40.8 % — SIGNIFICANT CHANGE UP (ref 34.5–45)
HCV AB S/CO SERPL IA: 0.12 S/CO — SIGNIFICANT CHANGE UP (ref 0–0.99)
HCV AB S/CO SERPL IA: 0.13 S/CO — SIGNIFICANT CHANGE UP (ref 0–0.99)
HCV AB SERPL-IMP: SIGNIFICANT CHANGE UP
HCV AB SERPL-IMP: SIGNIFICANT CHANGE UP
HGB BLD-MCNC: 13.1 G/DL — SIGNIFICANT CHANGE UP (ref 11.5–15.5)
HSV DNA1: SIGNIFICANT CHANGE UP
HSV DNA2: SIGNIFICANT CHANGE UP
HSV1 DNA BLD QL NAA+PROBE: SIGNIFICANT CHANGE UP
HSV2 DNA BLD QL NAA+PROBE: SIGNIFICANT CHANGE UP
INR BLD: 1.02 RATIO — SIGNIFICANT CHANGE UP (ref 0.85–1.18)
MAGNESIUM SERPL-MCNC: 2.4 MG/DL — SIGNIFICANT CHANGE UP (ref 1.6–2.6)
MCHC RBC-ENTMCNC: 30 PG — SIGNIFICANT CHANGE UP (ref 27–34)
MCHC RBC-ENTMCNC: 32.1 GM/DL — SIGNIFICANT CHANGE UP (ref 32–36)
MCV RBC AUTO: 93.4 FL — SIGNIFICANT CHANGE UP (ref 80–100)
NRBC # BLD: 0 /100 WBCS — SIGNIFICANT CHANGE UP (ref 0–0)
PHOSPHATE SERPL-MCNC: 3.6 MG/DL — SIGNIFICANT CHANGE UP (ref 2.5–4.5)
PLATELET # BLD AUTO: 194 K/UL — SIGNIFICANT CHANGE UP (ref 150–400)
POTASSIUM SERPL-MCNC: 4.4 MMOL/L — SIGNIFICANT CHANGE UP (ref 3.5–5.3)
POTASSIUM SERPL-SCNC: 4.4 MMOL/L — SIGNIFICANT CHANGE UP (ref 3.5–5.3)
PROT SERPL-MCNC: 6.6 G/DL — SIGNIFICANT CHANGE UP (ref 6–8.3)
PROTHROM AB SERPL-ACNC: 11.6 SEC — SIGNIFICANT CHANGE UP (ref 9.5–13)
RBC # BLD: 4.37 M/UL — SIGNIFICANT CHANGE UP (ref 3.8–5.2)
RBC # FLD: 13.2 % — SIGNIFICANT CHANGE UP (ref 10.3–14.5)
SALICYLATES SERPL-MCNC: <1.7 MG/DL — LOW (ref 2.8–20)
SODIUM SERPL-SCNC: 142 MMOL/L — SIGNIFICANT CHANGE UP (ref 135–145)
WBC # BLD: 5.45 K/UL — SIGNIFICANT CHANGE UP (ref 3.8–10.5)
WBC # FLD AUTO: 5.45 K/UL — SIGNIFICANT CHANGE UP (ref 3.8–10.5)

## 2024-05-11 PROCEDURE — 99232 SBSQ HOSP IP/OBS MODERATE 35: CPT

## 2024-05-11 RX ORDER — POLYETHYLENE GLYCOL 3350 17 G/17G
17 POWDER, FOR SOLUTION ORAL DAILY
Refills: 0 | Status: DISCONTINUED | OUTPATIENT
Start: 2024-05-11 | End: 2024-05-12

## 2024-05-11 RX ADMIN — Medication 1 ENEMA: at 10:46

## 2024-05-11 RX ADMIN — POLYETHYLENE GLYCOL 3350 17 GRAM(S): 17 POWDER, FOR SOLUTION ORAL at 11:35

## 2024-05-11 NOTE — PROGRESS NOTE ADULT - ASSESSMENT
22 y/o F with PMH of cholecystectomy who p/w severe onset RUQ, nausea, and vomiting. Patient's LFTs were noted to be significantly elevated. Patient is being admitted for further management of abdominal pain and transaminitis.     #Transaminitis - unclear etiology - possible creatine supplement  #Abdominal pain  #s/p cholecystectomy  Patient recently started taking creatine supplements. 1 day of sudden abdominal pain and nausea vomitting. Found to have elevated liver enzymes, no encephalopathy or synthetic dysfunction. Can possible Imaging unremarkable.  - LFTs trending down -continue to monitor  - Monitor INR - stable  - f/u Hepatology recs - f/u labs, sent  - advised to hold all OTC  - DVT ppx

## 2024-05-11 NOTE — PROGRESS NOTE ADULT - SUBJECTIVE AND OBJECTIVE BOX
[facility]  Patient is a 23y old  Female who presents with a chief complaint of     SUBJECTIVE / OVERNIGHT EVENTS:  No acute events over night. Denies any fevers/chills, headache, CP, SOB, abd pain, N/V/D, constipation, or leg swelling.       MEDICATIONS  (STANDING):  polyethylene glycol 3350 17 Gram(s) Oral daily    MEDICATIONS  (PRN):  ondansetron Injectable 4 milliGRAM(s) IV Push every 8 hours PRN Nausea and/or Vomiting          OBJECTIVE:  Vital Signs Last 24 Hrs  T(C): 36.9 (11 May 2024 14:48), Max: 37 (11 May 2024 05:37)  T(F): 98.5 (11 May 2024 14:48), Max: 98.6 (11 May 2024 05:37)  HR: 79 (11 May 2024 14:48) (66 - 79)  BP: 103/67 (11 May 2024 14:48) (103/67 - 119/78)  BP(mean): --  RR: 16 (11 May 2024 14:48) (16 - 18)  SpO2: 96% (11 May 2024 14:48) (96% - 99%)    Parameters below as of 11 May 2024 14:48  Patient On (Oxygen Delivery Method): room air      PHYSICAL EXAM:  GENERAL: NAD, well-developed  HEAD:  Atraumatic, Normocephalic  EYES: conjunctiva and sclera clear  NECK: Supple, No JVD  CHEST/LUNG: Clear to auscultation bilaterally; No wheeze  HEART: Regular rate and rhythm; No murmurs, rubs, or gallops  ABDOMEN: Soft, Nontender, Nondistended; Bowel sounds present  EXTREMITIES:  No clubbing, cyanosis, or edema  PSYCH: AAOx3  NEUROLOGY: non-focal  SKIN: No rashes or lesions    CAPILLARY BLOOD GLUCOSE        I&O's Summary            LABS:                        13.1   5.45  )-----------( 194      ( 11 May 2024 05:40 )             40.8     05-11    142  |  109<H>  |  7   ----------------------------<  98  4.4   |  29  |  0.66    Ca    8.8      11 May 2024 05:40  Phos  3.6     05-11  Mg     2.4     05-11    TPro  6.6  /  Alb  3.4<L>  /  TBili  1.8<H>  /  DBili  0.4<H>  /  AST  497<H>  /  ALT  677<H>  /  AlkPhos  233<H>  05-11    PT/INR - ( 11 May 2024 05:40 )   PT: 11.6 sec;   INR: 1.02 ratio           CARDIAC MARKERS ( 10 May 2024 14:18 )  x     / x     / 111 U/L / x     / x          Urinalysis Basic - ( 11 May 2024 05:40 )    Color: x / Appearance: x / SG: x / pH: x  Gluc: 98 mg/dL / Ketone: x  / Bili: x / Urobili: x   Blood: x / Protein: x / Nitrite: x   Leuk Esterase: x / RBC: x / WBC x   Sq Epi: x / Non Sq Epi: x / Bacteria: x            RADIOLOGY & ADDITIONAL TESTS:

## 2024-05-12 ENCOUNTER — TRANSCRIPTION ENCOUNTER (OUTPATIENT)
Age: 24
End: 2024-05-12

## 2024-05-12 VITALS
TEMPERATURE: 98 F | DIASTOLIC BLOOD PRESSURE: 64 MMHG | OXYGEN SATURATION: 97 % | HEART RATE: 75 BPM | RESPIRATION RATE: 18 BRPM | SYSTOLIC BLOOD PRESSURE: 103 MMHG

## 2024-05-12 LAB
ALBUMIN SERPL ELPH-MCNC: 3.6 G/DL — SIGNIFICANT CHANGE UP (ref 3.5–5)
ALP SERPL-CCNC: 207 U/L — HIGH (ref 40–120)
ALT FLD-CCNC: 432 U/L DA — HIGH (ref 10–60)
ANION GAP SERPL CALC-SCNC: 5 MMOL/L — SIGNIFICANT CHANGE UP (ref 5–17)
AST SERPL-CCNC: 138 U/L — HIGH (ref 10–40)
BILIRUB DIRECT SERPL-MCNC: 0.2 MG/DL — SIGNIFICANT CHANGE UP (ref 0–0.3)
BILIRUB INDIRECT FLD-MCNC: 0.4 MG/DL — SIGNIFICANT CHANGE UP (ref 0.2–1)
BILIRUB SERPL-MCNC: 0.6 MG/DL — SIGNIFICANT CHANGE UP (ref 0.2–1.2)
BUN SERPL-MCNC: 12 MG/DL — SIGNIFICANT CHANGE UP (ref 7–18)
CALCIUM SERPL-MCNC: 8.9 MG/DL — SIGNIFICANT CHANGE UP (ref 8.4–10.5)
CHLORIDE SERPL-SCNC: 108 MMOL/L — SIGNIFICANT CHANGE UP (ref 96–108)
CO2 SERPL-SCNC: 28 MMOL/L — SIGNIFICANT CHANGE UP (ref 22–31)
CREAT SERPL-MCNC: 0.73 MG/DL — SIGNIFICANT CHANGE UP (ref 0.5–1.3)
EGFR: 119 ML/MIN/1.73M2 — SIGNIFICANT CHANGE UP
GLUCOSE SERPL-MCNC: 104 MG/DL — HIGH (ref 70–99)
HCT VFR BLD CALC: 41.4 % — SIGNIFICANT CHANGE UP (ref 34.5–45)
HEV AB FLD QL: NEGATIVE — SIGNIFICANT CHANGE UP
HEV IGM SER QL: NEGATIVE — SIGNIFICANT CHANGE UP
HGB BLD-MCNC: 12.9 G/DL — SIGNIFICANT CHANGE UP (ref 11.5–15.5)
INR BLD: 0.94 RATIO — SIGNIFICANT CHANGE UP (ref 0.85–1.18)
MAGNESIUM SERPL-MCNC: 2.2 MG/DL — SIGNIFICANT CHANGE UP (ref 1.6–2.6)
MCHC RBC-ENTMCNC: 29.5 PG — SIGNIFICANT CHANGE UP (ref 27–34)
MCHC RBC-ENTMCNC: 31.2 GM/DL — LOW (ref 32–36)
MCV RBC AUTO: 94.5 FL — SIGNIFICANT CHANGE UP (ref 80–100)
NRBC # BLD: 0 /100 WBCS — SIGNIFICANT CHANGE UP (ref 0–0)
PHOSPHATE SERPL-MCNC: 4 MG/DL — SIGNIFICANT CHANGE UP (ref 2.5–4.5)
PLATELET # BLD AUTO: 199 K/UL — SIGNIFICANT CHANGE UP (ref 150–400)
POTASSIUM SERPL-MCNC: 4.3 MMOL/L — SIGNIFICANT CHANGE UP (ref 3.5–5.3)
POTASSIUM SERPL-SCNC: 4.3 MMOL/L — SIGNIFICANT CHANGE UP (ref 3.5–5.3)
PROT SERPL-MCNC: 6.8 G/DL — SIGNIFICANT CHANGE UP (ref 6–8.3)
PROTHROM AB SERPL-ACNC: 10.7 SEC — SIGNIFICANT CHANGE UP (ref 9.5–13)
RBC # BLD: 4.38 M/UL — SIGNIFICANT CHANGE UP (ref 3.8–5.2)
RBC # FLD: 13.2 % — SIGNIFICANT CHANGE UP (ref 10.3–14.5)
SODIUM SERPL-SCNC: 141 MMOL/L — SIGNIFICANT CHANGE UP (ref 135–145)
WBC # BLD: 7.13 K/UL — SIGNIFICANT CHANGE UP (ref 3.8–10.5)
WBC # FLD AUTO: 7.13 K/UL — SIGNIFICANT CHANGE UP (ref 3.8–10.5)

## 2024-05-12 PROCEDURE — 76705 ECHO EXAM OF ABDOMEN: CPT

## 2024-05-12 PROCEDURE — 86790 VIRUS ANTIBODY NOS: CPT

## 2024-05-12 PROCEDURE — 80074 ACUTE HEPATITIS PANEL: CPT

## 2024-05-12 PROCEDURE — 99239 HOSP IP/OBS DSCHRG MGMT >30: CPT

## 2024-05-12 PROCEDURE — 71046 X-RAY EXAM CHEST 2 VIEWS: CPT

## 2024-05-12 PROCEDURE — 80307 DRUG TEST PRSMV CHEM ANLYZR: CPT

## 2024-05-12 PROCEDURE — 86381 MITOCHONDRIAL ANTIBODY EACH: CPT

## 2024-05-12 PROCEDURE — 82103 ALPHA-1-ANTITRYPSIN TOTAL: CPT

## 2024-05-12 PROCEDURE — 82390 ASSAY OF CERULOPLASMIN: CPT

## 2024-05-12 PROCEDURE — 80076 HEPATIC FUNCTION PANEL: CPT

## 2024-05-12 PROCEDURE — 85027 COMPLETE CBC AUTOMATED: CPT

## 2024-05-12 PROCEDURE — 83520 IMMUNOASSAY QUANT NOS NONAB: CPT

## 2024-05-12 PROCEDURE — 86706 HEP B SURFACE ANTIBODY: CPT

## 2024-05-12 PROCEDURE — 86038 ANTINUCLEAR ANTIBODIES: CPT

## 2024-05-12 PROCEDURE — 82550 ASSAY OF CK (CPK): CPT

## 2024-05-12 PROCEDURE — 86704 HEP B CORE ANTIBODY TOTAL: CPT

## 2024-05-12 PROCEDURE — 87340 HEPATITIS B SURFACE AG IA: CPT

## 2024-05-12 PROCEDURE — 96375 TX/PRO/DX INJ NEW DRUG ADDON: CPT

## 2024-05-12 PROCEDURE — 84100 ASSAY OF PHOSPHORUS: CPT

## 2024-05-12 PROCEDURE — 83690 ASSAY OF LIPASE: CPT

## 2024-05-12 PROCEDURE — 36415 COLL VENOUS BLD VENIPUNCTURE: CPT

## 2024-05-12 PROCEDURE — 85025 COMPLETE CBC W/AUTO DIFF WBC: CPT

## 2024-05-12 PROCEDURE — 86376 MICROSOMAL ANTIBODY EACH: CPT

## 2024-05-12 PROCEDURE — 83735 ASSAY OF MAGNESIUM: CPT

## 2024-05-12 PROCEDURE — 86255 FLUORESCENT ANTIBODY SCREEN: CPT

## 2024-05-12 PROCEDURE — 93005 ELECTROCARDIOGRAM TRACING: CPT

## 2024-05-12 PROCEDURE — 82787 IGG 1 2 3 OR 4 EACH: CPT

## 2024-05-12 PROCEDURE — 74177 CT ABD & PELVIS W/CONTRAST: CPT | Mod: MC

## 2024-05-12 PROCEDURE — 86705 HEP B CORE ANTIBODY IGM: CPT

## 2024-05-12 PROCEDURE — 80053 COMPREHEN METABOLIC PANEL: CPT

## 2024-05-12 PROCEDURE — 87799 DETECT AGENT NOS DNA QUANT: CPT

## 2024-05-12 PROCEDURE — 87798 DETECT AGENT NOS DNA AMP: CPT

## 2024-05-12 PROCEDURE — 84702 CHORIONIC GONADOTROPIN TEST: CPT

## 2024-05-12 PROCEDURE — 87529 HSV DNA AMP PROBE: CPT

## 2024-05-12 PROCEDURE — 99285 EMERGENCY DEPT VISIT HI MDM: CPT

## 2024-05-12 PROCEDURE — 85610 PROTHROMBIN TIME: CPT

## 2024-05-12 PROCEDURE — 84484 ASSAY OF TROPONIN QUANT: CPT

## 2024-05-12 PROCEDURE — 96374 THER/PROPH/DIAG INJ IV PUSH: CPT

## 2024-05-12 PROCEDURE — 80048 BASIC METABOLIC PNL TOTAL CA: CPT

## 2024-05-12 RX ADMIN — POLYETHYLENE GLYCOL 3350 17 GRAM(S): 17 POWDER, FOR SOLUTION ORAL at 11:24

## 2024-05-12 NOTE — DISCHARGE NOTE PROVIDER - CARE PROVIDER_API CALL
[facility]  Internal Medicine  [address] [place] [address]  Phone: [phone]  Fax: [phone]  Follow Up Time: 1 week

## 2024-05-12 NOTE — DISCHARGE NOTE PROVIDER - HOSPITAL COURSE
23y Female w/ Hx of cholecystectomy due to gallstones presented to [facility] ER on 5/10/24 w/ 1 day sudden onset of RUQ pain, a/w nausea and one episode of NBNB vomiting, w/o any other associated symptoms, and was found to have severe hepatocellular liver injury w/ mild hyperbilirubinemia and leukocytosis.   Hepatology was consulted for the elevated liver enzymes. CT a/p and US abd showed s/p cholecystectomy, and normal biliary tree; lipase WNL.     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  - Advised to stop all OTC supplements, especially the new ones  ("Best BCAA Shredded", "Best Creatine")  - Advised on complete alcohol abstinence      Liver enzymes downtrending, pt medically cleared by attending w/ outpt hepatology f/u. Please note that this a brief summary of hospital course please refer to daily progress notes and consult notes for full course and events

## 2024-05-12 NOTE — DISCHARGE NOTE PROVIDER - NSDCCPCAREPLAN_GEN_ALL_CORE_FT
PRINCIPAL DISCHARGE DIAGNOSIS  Diagnosis: Liver cell injury  Assessment and Plan of Treatment: You presented to the hospital with complaints of abdominal pain, nausea and vomiting. Your abdominal ultrasound was unremarkable. Your Ct of your abdomen showed No evidence acute abdominal or pelvic pathology. Status post cholecystectomy. No biliary duct dilatation. Large amounts of fecal material in portions of colon and rectum without evidence of bowel obstruction.   You were seen by a hepatologist for elevated liver enzymes likely due to over the counter supplements. Hepatology reccommended: evaluate for viral hepatitis, stop all OTC supplements, especially the new ones  ("Best BCAA Shredded", "Best Creatine") and advised on complete alcohol abstinence.  Please follow up with Dr. Munoz in 1 week for monitoring.

## 2024-05-12 NOTE — DISCHARGE NOTE NURSING/CASE MANAGEMENT/SOCIAL WORK - NSDCPEFALRISK_GEN_ALL_CORE
For information on Fall & Injury Prevention, visit: [email or web address] OR  [email or web address] OR  https://www.cdc.gov/steadi/patient.html

## 2024-05-12 NOTE — DISCHARGE NOTE NURSING/CASE MANAGEMENT/SOCIAL WORK - PATIENT PORTAL LINK FT
You can access the FollowMyHealth Patient Portal offered by [facility] by registering at the following website: [email or web address]. By joining [facility]’s FollowMyHealth portal, you will also be able to view your health information using other applications (apps) compatible with our system.

## 2024-05-12 NOTE — DISCHARGE NOTE PROVIDER - ATTENDING DISCHARGE PHYSICAL EXAMINATION:
GENERAL: NAD, well-developed  HEAD:  Atraumatic, Normocephalic  EYES: conjunctiva and sclera clear  NECK: Supple, No JVD  CHEST/LUNG: Clear to auscultation bilaterally; No wheeze  HEART: Regular rate and rhythm; No murmurs, rubs, or gallops  ABDOMEN: Soft, Nontender, Nondistended; Bowel sounds present  EXTREMITIES:  No clubbing, cyanosis, or edema  PSYCH: AAOx3  NEUROLOGY: non-focal  SKIN: No rashes or lesions

## 2024-05-13 LAB
CMV DNA CSF QL NAA+PROBE: SIGNIFICANT CHANGE UP IU/ML
CMV DNA SPEC NAA+PROBE-LOG#: SIGNIFICANT CHANGE UP LOG10IU/ML
EBV DNA SERPL NAA+PROBE-ACNC: SIGNIFICANT CHANGE UP IU/ML
EBVPCR LOG: SIGNIFICANT CHANGE UP LOG10IU/ML
IGG4 SER-MCNC: 50.6 MG/DL — SIGNIFICANT CHANGE UP (ref 1–123)

## 2024-05-14 LAB
ANA TITR SER: NEGATIVE — SIGNIFICANT CHANGE UP
ANNOTATION COMMENT IMP: SIGNIFICANT CHANGE UP
HEV RNA SERPL NAA+PROBE-ACNC: SIGNIFICANT CHANGE UP IU/ML
HEV RNA SERPL NAA+PROBE-LOG IU: <3.3 LOG IU/ML — SIGNIFICANT CHANGE UP
LKM AB SER-ACNC: <20.1 UNITS — SIGNIFICANT CHANGE UP (ref 0–20)
MITOCHONDRIA AB SER-ACNC: SIGNIFICANT CHANGE UP
SMOOTH MUSCLE AB SER-ACNC: ABNORMAL
SPECIMEN SOURCE: SIGNIFICANT CHANGE UP

## 2024-05-16 LAB — SOLUBLE LIVER IGG SER IA-ACNC: 2 — SIGNIFICANT CHANGE UP (ref 0–20)

## 2024-05-17 LAB — VZV DNA, PCR RESULT: NEGATIVE — SIGNIFICANT CHANGE UP

## 2024-05-24 ENCOUNTER — NON-APPOINTMENT (OUTPATIENT)
Age: 24
End: 2024-05-24

## 2024-05-28 NOTE — H&P PST ADULT - VENOUS THROMBOEMBOLISM BMI
[Time Spent: ___ minutes] : I have spent [unfilled] minutes of time on the encounter.
(0) indicator not present

## 2024-06-13 ENCOUNTER — NON-APPOINTMENT (OUTPATIENT)
Age: 24
End: 2024-06-13

## 2024-06-24 ENCOUNTER — NON-APPOINTMENT (OUTPATIENT)
Age: 24
End: 2024-06-24

## 2024-06-28 ENCOUNTER — NON-APPOINTMENT (OUTPATIENT)
Age: 24
End: 2024-06-28

## 2024-07-14 ENCOUNTER — NON-APPOINTMENT (OUTPATIENT)
Age: 24
End: 2024-07-14

## 2024-07-15 ENCOUNTER — NON-APPOINTMENT (OUTPATIENT)
Age: 24
End: 2024-07-15

## 2024-08-30 ENCOUNTER — NON-APPOINTMENT (OUTPATIENT)
Age: 24
End: 2024-08-30

## 2024-10-08 ENCOUNTER — NON-APPOINTMENT (OUTPATIENT)
Age: 24
End: 2024-10-08

## 2024-11-03 ENCOUNTER — NON-APPOINTMENT (OUTPATIENT)
Age: 24
End: 2024-11-03

## 2024-11-26 NOTE — H&P PST ADULT - ALLERGIC/IMMUNOLOGIC
Prednisone 2 tab daily for 4 days then 1 tab daily for 2 days  Benzonatate for cough up to 3 times daily  ok to use mucinex DM, delsym DM or dayquil as well  Keep the throat moist to reduce cough    Viral testing will be back in about 1 hour--we will call with any positive results   negative

## 2024-12-02 NOTE — PATIENT PROFILE ADULT - FUNCTIONAL ASSESSMENT - BASIC MOBILITY 1.
Medication Refill Request    Chantale Betancur is requesting a refill of the following medication(s):   Requested Prescriptions     Pending Prescriptions Disp Refills    metFORMIN (GLUCOPHAGE-XR) 500 MG extended release tablet [Pharmacy Med Name: METFORMIN HCL  MG TABLET] 180 tablet 3     Sig: TAKE 1 TABLET BY MOUTH WITH BREAKFAST AND WITH EVENING MEAL . AFTER ONE WEEK INCREASE TO TWICE DAILY - WITH BREAKFAST AND DINNER        Listed PCP is Joseph Kearney MD   Last provider to prescribe medication: Caio  Last Date of Medication Prescribed: 1/17/24   Date of Last Office Visit at [facility]: 3/20/24   FUTURE APPOINTMENT: No future appointments.    Please send refill to:    [facility]/pharmacy #[id number] - [place], [place] - [address] [place] [address] - P [phone] - F [phone]  [address] [place] [address]  Phone: [phone] Fax: [phone]      Please review request and approve or deny with recommendations.     4 = No assist / stand by assistance

## 2025-03-26 NOTE — ED CDU PROVIDER SUBSEQUENT DAY NOTE - PSYCHIATRIC, MLM
1201  DPA sent [facility] blanket [place] area.   Alert and oriented to person, place, time/situation. normal mood and affect. no apparent risk to self or others.